# Patient Record
Sex: MALE | Race: WHITE | Employment: FULL TIME | URBAN - METROPOLITAN AREA
[De-identification: names, ages, dates, MRNs, and addresses within clinical notes are randomized per-mention and may not be internally consistent; named-entity substitution may affect disease eponyms.]

---

## 2018-02-17 ENCOUNTER — HOSPITAL ENCOUNTER (EMERGENCY)
Facility: CLINIC | Age: 28
Discharge: HOME | End: 2018-02-17
Attending: EMERGENCY MEDICINE | Admitting: EMERGENCY MEDICINE
Payer: COMMERCIAL

## 2018-02-17 VITALS
WEIGHT: 195 LBS | DIASTOLIC BLOOD PRESSURE: 74 MMHG | HEART RATE: 95 BPM | RESPIRATION RATE: 18 BRPM | BODY MASS INDEX: 26.41 KG/M2 | OXYGEN SATURATION: 97 % | SYSTOLIC BLOOD PRESSURE: 132 MMHG | HEIGHT: 72 IN | TEMPERATURE: 101.8 F

## 2018-02-17 DIAGNOSIS — J11.1 INFLUENZA: ICD-10-CM

## 2018-02-17 PROCEDURE — 96374 THER/PROPH/DIAG INJ IV PUSH: CPT

## 2018-02-17 PROCEDURE — 99284 EMERGENCY DEPT VISIT MOD MDM: CPT

## 2018-02-17 PROCEDURE — 25000128 H RX IP 250 OP 636: Performed by: EMERGENCY MEDICINE

## 2018-02-17 RX ORDER — DEXAMETHASONE SODIUM PHOSPHATE 10 MG/ML
10 INJECTION, SOLUTION INTRAMUSCULAR; INTRAVENOUS ONCE
Status: COMPLETED | OUTPATIENT
Start: 2018-02-17 | End: 2018-02-17

## 2018-02-17 RX ADMIN — DEXAMETHASONE SODIUM PHOSPHATE 10 MG: 10 INJECTION, SOLUTION INTRAMUSCULAR; INTRAVENOUS at 20:15

## 2018-02-17 ASSESSMENT — ENCOUNTER SYMPTOMS
FEVER: 1
NAUSEA: 0
COUGH: 1
CHILLS: 1
DIARRHEA: 0
VOMITING: 0

## 2018-02-17 NOTE — ED AVS SNAPSHOT
Emergency Department    [address] [place] [address]    Phone:  [phone]    Fax:  [phone]                                       Mohan Murray   MRN: [id number]    Department:   Emergency Department   Date of Visit:  2/17/2018           After Visit Summary Signature Page     I have received my discharge instructions, and my questions have been answered. I have discussed any challenges I see with this plan with the nurse or doctor.    ..........................................................................................................................................  Patient/Patient Representative Signature      ..........................................................................................................................................  Patient Representative Print Name and Relationship to Patient    ..................................................               ................................................  Date                                            Time    ..........................................................................................................................................  Reviewed by Signature/Title    ...................................................              ..............................................  Date                                                            Time

## 2018-02-17 NOTE — LETTER
February 17, 2018      To Whom It May Concern:      Mohan Murray was seen in our Emergency Department today, 02/17/18.  Please excuse him from work for the next 3 days.      Sincerely,        Claudio Arteaga MD

## 2018-02-17 NOTE — ED AVS SNAPSHOT
Emergency Department    [address] [facility] [address]    Phone:  [phone]    Fax:  [phone]                                       Mohan Murray   MRN: [id number]    Department:   Emergency Department   Date of Visit:  2/17/2018           Patient Information     Date Of Birth          1990        Your diagnoses for this visit were:     Influenza        You were seen by Claudio Arteaga MD.      Follow-up Information     Follow up with Rosy Galloway MD In 5 days.    Specialty:  Family Practice    Contact information:    [address]  [place] [address]  [phone]          Follow up with  Emergency Department.    Specialty:  EMERGENCY MEDICINE    Why:  As needed, If symptoms worsen    Contact information:    [address] [facility] [id number] [phone]        Discharge Instructions         The Flu (Influenza)     The virus that causes the flu spreads through the air in droplets when someone who has the flu coughs, sneezes, laughs, or talks.   The flu (influenza) is an infection that affects your respiratory tract. This tract is made up of your mouth, nose, and lungs, and the passages between them. Unlike a cold, the flu can make you very ill. And it can lead to pneumonia, a serious lung infection. The flu can have serious complications and even cause death.  Who is at risk for the flu?  Anyone can get the flu. But you are more likely to become infected if you:    Have a weakened immune system    Work in a healthcare setting where you may be exposed to flu germs    Live or work with someone who has the flu    Haven t had an annual flu shot  How does the flu spread?  The flu is caused by a virus. The virus spreads through the air in droplets when someone who has the flu coughs, sneezes, laughs, or talks. You can become infected when you inhale these viruses directly. You can also become infected when you touch a surface on which  the droplets have landed and then transfer the germs to your eyes, nose, or mouth. Touching used tissues, or sharing utensils, drinking glasses, or a toothbrush from an infected person can expose you to flu viruses, too.  What are the symptoms of the flu?  Flu symptoms tend to come on quickly and may last a few days to a few weeks. They include:    Fever usually higher than 100.4 F  (38 C) and chills    Sore throat and headache    Dry cough    Runny nose    Tiredness and weakness    Muscle aches  Who is at risk for flu complications?  For some people, the flu can be very serious. The risk for complications is greater for:    Children younger than age 5    Adults ages 65 and older    People with a chronic illness such as diabetes or heart, kidney, or lung disease    People who live in a nursing home or long-term care facility   How is the flu treated?  The flu usually gets better after 7 days or so. In some cases, your healthcare provider may prescribe an antiviral medicine. This may help you get well a little sooner. For the medicine to help, you need to take it as soon as possible (ideally within 48 hours) after your symptoms start. If you develop pneumonia or other serious illness, you may need to stay in the hospital.  Easing flu symptoms    Drink lots of fluids such as water, juice, and warm soup. A good rule is to drink enough so that you urinate your normal amount.    Get plenty of rest.    Ask your healthcare provider what to take for fever and pain.    Call your provider if your fever is 100.4 F (38 C) or higher, or you become dizzy, lightheaded, or short of breath.  Taking steps to protect others    Wash your hands often, especially after coughing or sneezing. Or clean your hands with an alcohol-based hand  containing at least 60% alcohol.    Cough or sneeze into a tissue. Then throw the tissue away and wash your hands. If you don t have a tissue, cough and sneeze into your elbow.    Stay home  until at least 24 hours after you no longer have a fever or chills. Be sure the fever isn t being hidden by fever-reducing medicine.    Don t share food, utensils, drinking glasses, or a toothbrush with others.    Ask your healthcare provider if others in your household should get antiviral medicine to help them avoid infection.  How can the flu be prevented?    One of the best ways to avoid the flu is to get a flu vaccine each year. The virus that causes the flu changes from year to year. For that reason, healthcare providers recommend getting the flu vaccine each year, as soon as it's available in your area. The vaccine is given as a shot. Your healthcare provider can tell you which vaccine is right for you. A nasal spray is also available but is not recommended for the 3579-4950 flu season. The CDC says this is because the nasal spray did not seem to protect against the flu over the last several flu seasons. In the past, it was meant for people ages 2 to 49.    Wash your hands often. Frequent handwashing is a proven way to help prevent infection.    Carry an alcohol-based hand gel containing at least 60% alcohol. Use it when you can't use soap and water. Then wash your hands as soon as you can.    Avoid touching your eyes, nose, and mouth.    At home and work, clean phones, computer keyboards, and toys often with disinfectant wipes.    If possible, avoid close contact with others who have the flu or symptoms of the flu.  Handwashing tips  Handwashing is one of the best ways to prevent many common infections. If you are caring for or visiting someone with the flu, wash your hands each time you enter and leave the room. Follow these steps:    Use warm water and plenty of soap. Rub your hands together well.    Clean the whole hand, including under your nails, between your fingers, and up the wrists.    Wash for at least 15 seconds.    Rinse, letting the water run down your fingers, not up your wrists.    Dry your  hands well. Use a paper towel to turn off the faucet and open the door.  Using alcohol-based hand   Alcohol-based hand  are also a good choice. Use them when you can't use soap and water. Follow these steps:    Squeeze about a tablespoon of gel into the palm of one hand.    Rub your hands together briskly, cleaning the backs of your hands, the palms, between your fingers, and up the wrists.    Rub until the gel is gone and your hands are completely dry.  Preventing the flu in healthcare settings  The flu is a special concern for people in hospitals and long-term care facilities. To help prevent the spread of flu, many hospitals and nursing homes take these steps:    Healthcare providers wash their hands or use an alcohol-based hand  before and after treating each patient.    People with the flu have private rooms and bathrooms or share a room with someone with the same infection.    People who are at high risk for the flu but don't have it are encouraged to get the flu and pneumonia vaccines.    All healthcare workers are encouraged or required to get flu shots.   Date Last Reviewed: 12/1/2016 2000-2017 [employer]. [address] [place] [address]. All rights reserved. This information is not intended as a substitute for professional medical care. Always follow your healthcare professional's instructions.          24 Hour Appointment Hotline       To make an appointment at any [facility], call [phone] ([phone]). If you don't have a family doctor or clinic, we will help you find one. [facility] are conveniently located to serve the needs of you and your family.             Review of your medicines      Our records show that you are taking the medicines listed below. If these are incorrect, please call your family doctor or clinic.        Dose / Directions Last dose taken    ADVAIR DISKUS 100-50 MCG/DOSE diskus inhaler   Generic drug:   fluticasone-salmeterol        Refills:  0        PROAIR  (90 BASE) MCG/ACT Inhaler   Generic drug:  albuterol        Refills:  0                Orders Needing Specimen Collection     None      Pending Results     No orders found from 2/15/2018 to 2/18/2018.            Pending Culture Results     No orders found from 2/15/2018 to 2/18/2018.            Pending Results Instructions     If you had any lab results that were not finalized at the time of your Discharge, you can call the ED Lab Result RN at [phone]. You will be contacted by this team for any positive Lab results or changes in treatment. The nurses are available 7 days a week from 10A to 6:30P.  You can leave a message 24 hours per day and they will return your call.        Test Results From Your Hospital Stay               Clinical Quality Measure: Blood Pressure Screening     Your blood pressure was checked while you were in the emergency department today. The last reading we obtained was  BP: 139/76 . Please read the guidelines below about what these numbers mean and what you should do about them.  If your systolic blood pressure (the top number) is less than 120 and your diastolic blood pressure (the bottom number) is less than 80, then your blood pressure is normal. There is nothing more that you need to do about it.  If your systolic blood pressure (the top number) is 120-139 or your diastolic blood pressure (the bottom number) is 80-89, your blood pressure may be higher than it should be. You should have your blood pressure rechecked within a year by a primary care provider.  If your systolic blood pressure (the top number) is 140 or greater or your diastolic blood pressure (the bottom number) is 90 or greater, you may have high blood pressure. High blood pressure is treatable, but if left untreated over time it can put you at risk for heart attack, stroke, or kidney failure. You should have your blood pressure rechecked by a primary care  "provider within the next 4 weeks.  If your provider in the emergency department today gave you specific instructions to follow-up with your doctor or provider even sooner than that, you should follow that instruction and not wait for up to 4 weeks for your follow-up visit.        Thank you for choosing [facility]       Thank you for choosing [facility] for your care. Our goal is always to provide you with excellent care. Hearing back from our patients is one way we can continue to improve our services. Please take a few minutes to complete the written survey that you may receive in the mail after you visit with us. Thank you!        [employer] Information     [employer] lets you send messages to your doctor, view your test results, renew your prescriptions, schedule appointments and more. To sign up, go to [email or web address] . Click on \"Log in\" on the left side of the screen, which will take you to the Welcome page. Then click on \"Sign up Now\" on the right side of the page.     You will be asked to enter the access code listed below, as well as some personal information. Please follow the directions to create your username and password.     Your access code is: [email or web address]  Expires: 2018  8:21 PM     Your access code will  in 90 days. If you need help or a new code, please call your [facility] clinic or [phone].        Care EveryWhere ID     This is your Care EveryWhere ID. This could be used by other organizations to access your [facility] medical records  [id number]        Equal Access to Services     LIA BECKER AH: Hadii bonnie child Sobrayan, waaxda luyaritzaadaha, qaybta kaalmada nahomy, erica barrett . So [place] [phone].    ATENCIÓN: Si habla español, tiene a pruitt disposición servicios gratuitos de asistencia lingüística. Wilmer al [phone].    We comply with applicable federal civil rights laws and [place] laws. We do not discriminate on the basis of race, color, " national origin, age, disability, sex, sexual orientation, or gender identity.            After Visit Summary       This is your record. Keep this with you and show to your community pharmacist(s) and doctor(s) at your next visit.

## 2018-02-18 NOTE — DISCHARGE INSTRUCTIONS
The Flu (Influenza)     The virus that causes the flu spreads through the air in droplets when someone who has the flu coughs, sneezes, laughs, or talks.   The flu (influenza) is an infection that affects your respiratory tract. This tract is made up of your mouth, nose, and lungs, and the passages between them. Unlike a cold, the flu can make you very ill. And it can lead to pneumonia, a serious lung infection. The flu can have serious complications and even cause death.  Who is at risk for the flu?  Anyone can get the flu. But you are more likely to become infected if you:    Have a weakened immune system    Work in a healthcare setting where you may be exposed to flu germs    Live or work with someone who has the flu    Haven t had an annual flu shot  How does the flu spread?  The flu is caused by a virus. The virus spreads through the air in droplets when someone who has the flu coughs, sneezes, laughs, or talks. You can become infected when you inhale these viruses directly. You can also become infected when you touch a surface on which the droplets have landed and then transfer the germs to your eyes, nose, or mouth. Touching used tissues, or sharing utensils, drinking glasses, or a toothbrush from an infected person can expose you to flu viruses, too.  What are the symptoms of the flu?  Flu symptoms tend to come on quickly and may last a few days to a few weeks. They include:    Fever usually higher than 100.4 F  (38 C) and chills    Sore throat and headache    Dry cough    Runny nose    Tiredness and weakness    Muscle aches  Who is at risk for flu complications?  For some people, the flu can be very serious. The risk for complications is greater for:    Children younger than age 5    Adults ages 65 and older    People with a chronic illness such as diabetes or heart, kidney, or lung disease    People who live in a nursing home or long-term care facility   How is the flu treated?  The flu usually gets  better after 7 days or so. In some cases, your healthcare provider may prescribe an antiviral medicine. This may help you get well a little sooner. For the medicine to help, you need to take it as soon as possible (ideally within 48 hours) after your symptoms start. If you develop pneumonia or other serious illness, you may need to stay in the hospital.  Easing flu symptoms    Drink lots of fluids such as water, juice, and warm soup. A good rule is to drink enough so that you urinate your normal amount.    Get plenty of rest.    Ask your healthcare provider what to take for fever and pain.    Call your provider if your fever is 100.4 F (38 C) or higher, or you become dizzy, lightheaded, or short of breath.  Taking steps to protect others    Wash your hands often, especially after coughing or sneezing. Or clean your hands with an alcohol-based hand  containing at least 60% alcohol.    Cough or sneeze into a tissue. Then throw the tissue away and wash your hands. If you don t have a tissue, cough and sneeze into your elbow.    Stay home until at least 24 hours after you no longer have a fever or chills. Be sure the fever isn t being hidden by fever-reducing medicine.    Don t share food, utensils, drinking glasses, or a toothbrush with others.    Ask your healthcare provider if others in your household should get antiviral medicine to help them avoid infection.  How can the flu be prevented?    One of the best ways to avoid the flu is to get a flu vaccine each year. The virus that causes the flu changes from year to year. For that reason, healthcare providers recommend getting the flu vaccine each year, as soon as it's available in your area. The vaccine is given as a shot. Your healthcare provider can tell you which vaccine is right for you. A nasal spray is also available but is not recommended for the 8594-5753 flu season. The CDC says this is because the nasal spray did not seem to protect against the flu  over the last several flu seasons. In the past, it was meant for people ages 2 to 49.    Wash your hands often. Frequent handwashing is a proven way to help prevent infection.    Carry an alcohol-based hand gel containing at least 60% alcohol. Use it when you can't use soap and water. Then wash your hands as soon as you can.    Avoid touching your eyes, nose, and mouth.    At home and work, clean phones, computer keyboards, and toys often with disinfectant wipes.    If possible, avoid close contact with others who have the flu or symptoms of the flu.  Handwashing tips  Handwashing is one of the best ways to prevent many common infections. If you are caring for or visiting someone with the flu, wash your hands each time you enter and leave the room. Follow these steps:    Use warm water and plenty of soap. Rub your hands together well.    Clean the whole hand, including under your nails, between your fingers, and up the wrists.    Wash for at least 15 seconds.    Rinse, letting the water run down your fingers, not up your wrists.    Dry your hands well. Use a paper towel to turn off the faucet and open the door.  Using alcohol-based hand   Alcohol-based hand  are also a good choice. Use them when you can't use soap and water. Follow these steps:    Squeeze about a tablespoon of gel into the palm of one hand.    Rub your hands together briskly, cleaning the backs of your hands, the palms, between your fingers, and up the wrists.    Rub until the gel is gone and your hands are completely dry.  Preventing the flu in healthcare settings  The flu is a special concern for people in hospitals and long-term care facilities. To help prevent the spread of flu, many hospitals and nursing homes take these steps:    Healthcare providers wash their hands or use an alcohol-based hand  before and after treating each patient.    People with the flu have private rooms and bathrooms or share a room with someone  with the same infection.    People who are at high risk for the flu but don't have it are encouraged to get the flu and pneumonia vaccines.    All healthcare workers are encouraged or required to get flu shots.   Date Last Reviewed: 12/1/2016 2000-2017 [employer]. [address] [place] [address]. All rights reserved. This information is not intended as a substitute for professional medical care. Always follow your healthcare professional's instructions.

## 2018-02-18 NOTE — ED PROVIDER NOTES
History     Chief Complaint:    Fever       HPI   Mohan Murray is a 27 year old male who presents to the ED today with a fever. The patient states that he has had a fever since last night. He also reports to having some body aches, headache, and chills. The patient states that he also has a cough, but he believes that it might be due to his asthma. He denies any nausea, vomiting, or diarrhea. He states that he took some Nyquil last night but has not had any other medication. Of note, the patient states that he did not receive his flu shot this season.     Allergies:  No known drug allergies.     Medications:    Advair Diskus   albuterol inhaler     Past Medical History:    Asthma     Past Surgical History:    History reviewed. No pertinent past surgical history.     Family History:    History reviewed. No pertinent family history.     Social History:  Marital Status: single   Presents to the ED alone   Tobacco Use: former smoker  Alcohol Use:  Yes   PCP: Rosy Galloway     Review of Systems   Constitutional: Positive for chills and fever.   Respiratory: Positive for cough.    Gastrointestinal: Negative for diarrhea, nausea and vomiting.   All other systems reviewed and are negative.    Physical Exam   First Vitals:  BP: 139/76  Heart Rate: 120  Temp: 101.8  F (38.8  C)  Height: 182.9 cm (6')  Weight: 88.5 kg (195 lb)  SpO2: 98 %      Physical Exam  General: Appears well-developed and well-nourished.   Head: No signs of trauma.   Mouth/Throat: Oropharynx is clear and moist.   Neck: Normal range of motion. No nuchal rigidity. No cervical adenopathy  CV: tachycardic and regular rhythm.    Resp: Effort normal and breath sounds normal. No respiratory distress.   GI: Soft. There is no tenderness.  No rebound or guarding.  Normal bowel sounds.    MSK: Normal range of motion. no edema. No Calf tenderness.  Neuro: The patient is alert and oriented.  Speech normal.  GCS 15  Skin: Skin is warm and dry. No rash  noted.   Psych: normal mood and affect. behavior is normal.       Emergency Department Course   Interventions:  (2015) Decadron 10 mg, IV      Emergency Department Course:  Nursing notes and vitals reviewed.  (1958) I performed an exam of the patient as documented above.    A peripheral IV was established.   Findings and plan explained to the patient. Patient discharged home with instructions regarding supportive care, medications, and reasons to return. The importance of close follow-up was reviewed.   Impression & Plan    Medical Decision Making:  Mohan Murray is a 27 year old male who presents with fever, body aches, headache, generally not feeling well.  He has had the symptoms since yesterday.  On my exam, he was mildly tachycardic, but his lung sounds are clear without any respiratory distress.  There is no meningeal signs.  Patient symptoms are very consistent with influenza.  I did discuss with him the limitations of testing for the flu and he was in agreement with not doing the test as I do not feel it would change his clinical course.  Patient does report a history of asthma and felt that he has been using his inhaler somewhat more often because of this.  I did give a dose of dexamethasone in the ER to help control possible asthma exacerbation from his influenza.  I discussed giving him some Tylenol or ibuprofen in the ER, but the patient preferred to go home and take it himself.  Given he is a young, healthy individual who is tolerating fluids well, I do not feel that further workup or treatment was indicated in the ER.  I did discuss the risks and benefits of Tamiflu and the patient declined it at this time.  Instructed to return if he has signs of worsening symptoms or any further concerns otherwise to follow-up with his primary care doctor.    Diagnosis:    ICD-10-CM    1. Influenza J11.1        Disposition:  discharged to home  Joanna BERRY, lexie serving as a scribe on 2/17/2018 at 7:58 PM  to personally document services performed by Dr. Arteaga based on my observations and the provider's statements to me.    2/17/2018    EMERGENCY DEPARTMENT       Claudio Arteaga MD  02/17/18 2179

## 2020-02-18 LAB
CHOLESTEROL (EXTERNAL): 219 MG/DL (ref 100–199)
HDLC SERPL-MCNC: 46 MG/DL
HIV 1&2 EXT: NORMAL
LDL CHOLESTEROL CALCULATED (EXTERNAL): 145 MG/DL
NON HDL CHOLESTEROL (EXTERNAL): 173 MG/DL
TRIGLYCERIDES (EXTERNAL): 138 MG/DL

## 2020-05-08 ENCOUNTER — VIRTUAL VISIT (OUTPATIENT)
Dept: FAMILY MEDICINE | Facility: OTHER | Age: 30
End: 2020-05-08

## 2020-05-08 NOTE — PROGRESS NOTES
"Date: 2020 09:58:50  Clinician: Barrett Rich  Clinician NPI: [id number]  Patient: Mohan Murray  Patient : 1990  Patient Address: [address] [place] [address] [place] [address]  Patient Phone: [phone]  Visit Protocol: URI  Patient Summary:  Mohan is a 29 year old ( : 1990 ) male who initiated a Visit for COVID-19 (Coronavirus) evaluation and screening. When asked the question \"Please sign me up to receive news, health information and promotions from [employer].\", Mohan responded \"No\".    Mohan states his symptoms started 1-2 days ago.   His symptoms consist of a sore throat, diarrhea, malaise, wheezing, and chills. He is experiencing mild difficulty breathing with activities but can speak normally in full sentences.   Symptom details     Sore throat: Mohan reports having mild throat pain (1-3 on a 10 point pain scale), does not have exudate on his tonsils, and can swallow liquids. He is not sure if the lymph nodes in his neck are enlarged. A rash has not appeared on the skin since the sore throat started.     Wheezing: Mohan has been diagnosed with asthma. The wheezing does not interfere with his normal daily activities.     Mohan denies having fever, myalgias, rhinitis, facial pain or pressure, cough, nasal congestion, vomiting, nausea, teeth pain, ageusia, anosmia, ear pain, and headache. He also denies taking antibiotic medication for the symptoms and having recent facial or sinus surgery in the past 60 days.   Precipitating events  Mohan is not sure if he has been exposed to someone with strep throat. He has not recently been exposed to someone with influenza. Mohan has been in close contact with the following high risk individuals: people with asthma, heart disease or diabetes and children under the age of 5.   Pertinent COVID-19 (Coronavirus) information  Mohan does not work or volunteer as healthcare worker or a  and does not work or volunteer in a healthcare " facility.   He does not live with a healthcare worker.   Mohan has not had a close contact with a laboratory-confirmed COVID-19 patient within 14 days of symptom onset. He also has not had a close contact with a suspected COVID-19 patient within 14 days of symptom onset.   Pertinent medical history  Mohan does not need a return to work/school note.   Weight: 220 lbs   Mohan does not smoke or use smokeless tobacco.   Weight: 220 lbs    MEDICATIONS: albuterol sulfate inhalation, Advair Diskus inhalation, ALLERGIES: NKDA  Clinician Response:  Dear Mohan,   Your symptoms show that you may have coronavirus (COVID-19). This illness can cause fever, cough and trouble breathing. Many people get a mild case and get better on their own. Some people can get very sick.   Will I be tested for COVID-19?  We would recommend you be tested for coronavirus. Here is how to get that scheduled:   Call [phone]. Tell them you were referred by [facility] to have a COVID-19 test. You will be scheduled at one of our [facility] testing locations (drive-up). Please have your [facility] visit information ready when you call including your visit ID number to verify you were referred.    How can I protect others in the meantime?  First, stay home and away from others (self-isolate) until:   You've had no fever---and no medicine that reduces fever---for 3 full days (72 hours). And...    Your other symptoms have gotten better. For example, your cough or breathing has improved. And...    At least 10 days have passed since your symptoms started.   During this time:   Don't go to work, school or anywhere else.     Stay away from others in your home. No hugging, kissing or shaking hands.    Don't let anyone visit.    Cover your mouth and nose with a mask, tissue or wash cloth to avoid spreading germs.    Wash your hands and face often. Use soap and water.   How can I take care of myself?  1.Take Tylenol (acetaminophen) for fever or pain. If you have  liver or kidney problems, ask your family doctor if it's okay to take Tylenol.   Adults can take either:    650 mg (two 325 mg pills) every 4 to 6 hours, or...    1,000 mg (two 500 mg pills) every 8 hours as needed.     Note: Don't take more than 3,000 mg in one day.   For children, check the Tylenol bottle for the right dose. The dose is based on the child's age or weight.  2.If you have other health problems (like cancer, heart failure, an organ transplant or severe kidney disease): Call your specialty clinic if you don't feel better in the next 2 days.  3.Know when to call 911: If your breathing is so bad that it keeps you from doing normal activities, call 911 or go to the emergency room. Tell them that you've been staying home and may have COVID-19.  4.Sign up for [employer]. We know it's scary to hear that you might have COVID-19. We want to track your symptoms to make sure you're okay over the next 2 weeks. Please look for an email from [employer]---this is a free, online program that we'll use to keep in touch. To sign up, follow the link in the email. Learn more at [email or web address].  Where can I get more information?  To learn more about COVID-19 and how to care for yourself at home, please visit the CDC website at https://www.cdc.gov/coronavirus/2019-ncov/about/steps-when-sick.html.  For more about your care at [facility], please visit [email or web address]     Diagnosis: Other asthma  Diagnosis ICD: J45.998

## 2021-05-08 ENCOUNTER — IMMUNIZATION (OUTPATIENT)
Dept: FAMILY MEDICINE | Facility: OTHER | Age: 31
End: 2021-05-08
Attending: FAMILY MEDICINE
Payer: COMMERCIAL

## 2021-05-08 PROCEDURE — 0001A PR COVID VAC PFIZER DIL RECON 30 MCG/0.3 ML IM: CPT

## 2021-05-08 PROCEDURE — 91300 PR COVID VAC PFIZER DIL RECON 30 MCG/0.3 ML IM: CPT

## 2021-05-27 ENCOUNTER — IMMUNIZATION (OUTPATIENT)
Dept: FAMILY MEDICINE | Facility: OTHER | Age: 31
End: 2021-05-27
Attending: FAMILY MEDICINE
Payer: COMMERCIAL

## 2021-05-27 PROCEDURE — 0002A PR COVID VAC PFIZER DIL RECON 30 MCG/0.3 ML IM: CPT

## 2021-05-27 PROCEDURE — 91300 PR COVID VAC PFIZER DIL RECON 30 MCG/0.3 ML IM: CPT

## 2025-05-23 ENCOUNTER — OFFICE VISIT (OUTPATIENT)
Dept: FAMILY MEDICINE | Facility: OTHER | Age: 35
End: 2025-05-23
Attending: STUDENT IN AN ORGANIZED HEALTH CARE EDUCATION/TRAINING PROGRAM
Payer: COMMERCIAL

## 2025-05-23 VITALS
TEMPERATURE: 97.8 F | DIASTOLIC BLOOD PRESSURE: 78 MMHG | WEIGHT: 219.1 LBS | HEIGHT: 73 IN | RESPIRATION RATE: 18 BRPM | OXYGEN SATURATION: 97 % | BODY MASS INDEX: 29.04 KG/M2 | HEART RATE: 84 BPM | SYSTOLIC BLOOD PRESSURE: 114 MMHG

## 2025-05-23 DIAGNOSIS — E55.9 VITAMIN D DEFICIENCY: ICD-10-CM

## 2025-05-23 DIAGNOSIS — J45.30 MILD PERSISTENT ASTHMA WITHOUT COMPLICATION: ICD-10-CM

## 2025-05-23 DIAGNOSIS — Z76.89 ENCOUNTER TO ESTABLISH CARE: ICD-10-CM

## 2025-05-23 DIAGNOSIS — K13.0 SORE OF LIP: ICD-10-CM

## 2025-05-23 DIAGNOSIS — R21 RASH AND NONSPECIFIC SKIN ERUPTION: ICD-10-CM

## 2025-05-23 DIAGNOSIS — K21.9 GASTROESOPHAGEAL REFLUX DISEASE WITHOUT ESOPHAGITIS: ICD-10-CM

## 2025-05-23 DIAGNOSIS — E78.5 HYPERLIPIDEMIA LDL GOAL <100: ICD-10-CM

## 2025-05-23 DIAGNOSIS — Z00.00 ROUTINE GENERAL MEDICAL EXAMINATION AT A HEALTH CARE FACILITY: Primary | ICD-10-CM

## 2025-05-23 DIAGNOSIS — F41.9 ANXIETY: ICD-10-CM

## 2025-05-23 RX ORDER — HYDROCORTISONE 25 MG/G
CREAM TOPICAL 2 TIMES DAILY
Qty: 30 G | Refills: 0 | Status: SHIPPED | OUTPATIENT
Start: 2025-05-23

## 2025-05-23 SDOH — HEALTH STABILITY: PHYSICAL HEALTH: ON AVERAGE, HOW MANY DAYS PER WEEK DO YOU ENGAGE IN MODERATE TO STRENUOUS EXERCISE (LIKE A BRISK WALK)?: 5 DAYS

## 2025-05-23 ASSESSMENT — PATIENT HEALTH QUESTIONNAIRE - PHQ9
SUM OF ALL RESPONSES TO PHQ QUESTIONS 1-9: 5
5. POOR APPETITE OR OVEREATING: SEVERAL DAYS

## 2025-05-23 ASSESSMENT — ANXIETY QUESTIONNAIRES
6. BECOMING EASILY ANNOYED OR IRRITABLE: MORE THAN HALF THE DAYS
7. FEELING AFRAID AS IF SOMETHING AWFUL MIGHT HAPPEN: NOT AT ALL
3. WORRYING TOO MUCH ABOUT DIFFERENT THINGS: MORE THAN HALF THE DAYS
GAD7 TOTAL SCORE: 7
GAD7 TOTAL SCORE: 7
2. NOT BEING ABLE TO STOP OR CONTROL WORRYING: SEVERAL DAYS
5. BEING SO RESTLESS THAT IT IS HARD TO SIT STILL: NOT AT ALL
1. FEELING NERVOUS, ANXIOUS, OR ON EDGE: SEVERAL DAYS
IF YOU CHECKED OFF ANY PROBLEMS ON THIS QUESTIONNAIRE, HOW DIFFICULT HAVE THESE PROBLEMS MADE IT FOR YOU TO DO YOUR WORK, TAKE CARE OF THINGS AT HOME, OR GET ALONG WITH OTHER PEOPLE: SOMEWHAT DIFFICULT

## 2025-05-23 ASSESSMENT — PAIN SCALES - GENERAL: PAINLEVEL_OUTOF10: NO PAIN (0)

## 2025-05-23 ASSESSMENT — SOCIAL DETERMINANTS OF HEALTH (SDOH): HOW OFTEN DO YOU GET TOGETHER WITH FRIENDS OR RELATIVES?: ONCE A WEEK

## 2025-05-23 ASSESSMENT — ASTHMA QUESTIONNAIRES: ACT_TOTALSCORE: 25

## 2025-05-23 NOTE — PATIENT INSTRUCTIONS
Zyrtec 10mg at bedtime for two weeks   Hydrocortisone 2.5mg as needed for itching.   If recurs, follow up for biopsy and labs.   Could consider buspirone (buspar). Would be 5mg twice daily.     Thank you for choosing [facility].   I have office hours 8:00 am to 4:30 pm on Mondays, Tuesdays, Thursdays and Fridays. I am out of the office most Wednesdays, although I do occasionally work one Wednesday per month.   Following your visit, if you had labs and diagnostic testing, once they have returned, I will review them and you will be contacted by myself or my nurse if there are concerns. You can also view the labs on [employer] K[employer].   For refills, notify your pharmacy regarding what you need and the pharmacy will generate a refill request. Please plan ahead and allow 3-5 days for refill requests.  If you have an urgent/same day appointment need, please request an urgent visit when you call and our support staff will send me an Overbook request to try to accommodate you for the urgent visit. I like to fit in and see my own patients and hopefully save you time too!   You can also now schedule appointments on the Notice Kiosk bob.   In the event that you need to be seen for urgent concerns and I am out of office, please see one of my colleagues for acute concerns or you may also present to Urgent Care or the ER.  I appreciate the opportunity to serve you and look forward to supporting your healthcare needs in the future.      Patient Education   Preventive Care Advice   This is general advice given by our system to help you stay healthy. However, your care team may have specific advice just for you. Please talk to your care team about your preventive care needs.  Nutrition  Eat 5 or more servings of fruits and vegetables each day.  Try wheat bread, brown rice and whole grain pasta (instead of white bread, rice, and pasta).  Get enough calcium and vitamin D. Check the label on foods and aim for 100% of the RDA (recommended  daily allowance).  Lifestyle  Exercise at least 150 minutes each week  (30 minutes a day, 5 days a week).  Do muscle strengthening activities 2 days a week. These help control your weight and prevent disease.  No smoking.  Wear sunscreen to prevent skin cancer.  Have a dental exam and cleaning every 6 months.  Yearly exams  See your health care team every year to talk about:  Any changes in your health.  Any medicines your care team has prescribed.  Preventive care, family planning, and ways to prevent chronic diseases.  Shots (vaccines)   HPV shots (up to age 26), if you've never had them before.  Hepatitis B shots (up to age 59), if you've never had them before.  COVID-19 shot: Get this shot when it's due.  Flu shot: Get a flu shot every year.  Tetanus shot: Get a tetanus shot every 10 years.  Pneumococcal, hepatitis A, and RSV shots: Ask your care team if you need these based on your risk.  Shingles shot (for age 50 and up)  General health tests  Diabetes screening:  Starting at age 35, Get screened for diabetes at least every 3 years.  If you are younger than age 35, ask your care team if you should be screened for diabetes.  Cholesterol test: At age 39, start having a cholesterol test every 5 years, or more often if advised.  Bone density scan (DEXA): At age 50, ask your care team if you should have this scan for osteoporosis (brittle bones).  Hepatitis C: Get tested at least once in your life.  STIs (sexually transmitted infections)  Before age 24: Ask your care team if you should be screened for STIs.  After age 24: Get screened for STIs if you're at risk. You are at risk for STIs (including HIV) if:  You are sexually active with more than one person.  You don't use condoms every time.  You or a partner was diagnosed with a sexually transmitted infection.  If you are at risk for HIV, ask about PrEP medicine to prevent HIV.  Get tested for HIV at least once in your life, whether you are at risk for HIV or  not.  Cancer screening tests  Cervical cancer screening: If you have a cervix, begin getting regular cervical cancer screening tests starting at age 21.  Breast cancer scan (mammogram): If you've ever had breasts, begin having regular mammograms starting at age 40. This is a scan to check for breast cancer.  Colon cancer screening: It is important to start screening for colon cancer at age 45.  Have a colonoscopy test every 10 years (or more often if you're at risk) Or, ask your provider about stool tests like a FIT test every year or Cologuard test every 3 years.  To learn more about your testing options, visit:   .  For help making a decision, visit:   https://bit.ly/go85951.  Prostate cancer screening test: If you have a prostate, ask your care team if a prostate cancer screening test (PSA) at age 55 is right for you.  Lung cancer screening: If you are a current or former smoker ages 50 to 80, ask your care team if ongoing lung cancer screenings are right for you.  For informational purposes only. Not to replace the advice of your health care provider. Copyright   2023 [facility] [employer]. All rights reserved. Clinically reviewed by the [facility]. Authernative [id number] - REV 01/24.  Learning About Stress  What is stress?     Stress is your body's response to a hard situation. Your body can have a physical, emotional, or mental response. Stress is a fact of life for most people, and it affects everyone differently. What causes stress for you may not be stressful for someone else.  A lot of things can cause stress. You may feel stress when you go on a job interview, take a test, or run a race. This kind of short-term stress is normal and even useful. It can help you if you need to work hard or react quickly. For example, stress can help you finish an important job on time.  Long-term stress is caused by ongoing stressful situations or events. Examples of long-term stress include  long-term health problems, ongoing problems at work, or conflicts in your family. Long-term stress can harm your health.  How does stress affect your health?  When you are stressed, your body responds as though you are in danger. It makes hormones that speed up your heart, make you breathe faster, and give you a burst of energy. This is called the fight-or-flight stress response. If the stress is over quickly, your body goes back to normal and no harm is done.  But if stress happens too often or lasts too long, it can have bad effects. Long-term stress can make you more likely to get sick, and it can make symptoms of some diseases worse. If you tense up when you are stressed, you may develop neck, shoulder, or low back pain. Stress is linked to high blood pressure and heart disease.  Stress also harms your emotional health. It can make you costa, tense, or depressed. Your relationships may suffer, and you may not do well at work or school.  What can you do to manage stress?  You can try these things to help manage stress:   Do something active. Exercise or activity can help reduce stress. Walking is a great way to get started. Even everyday activities such as housecleaning or yard work can help.  Try yoga or estrella chi. These techniques combine exercise and meditation. You may need some training at first to learn them.  Do something you enjoy. For example, listen to music or go to a movie. Practice your hobby or do volunteer work.  Meditate. This can help you relax, because you are not worrying about what happened before or what may happen in the future.  Do guided imagery. Imagine yourself in any setting that helps you feel calm. You can use online videos, books, or a teacher to guide you.  Do breathing exercises. For example:  From a standing position, bend forward from the waist with your knees slightly bent. Let your arms dangle close to the floor.  Breathe in slowly and deeply as you return to a standing position.  "Roll up slowly and lift your head last.  Hold your breath for just a few seconds in the standing position.  Breathe out slowly and bend forward from the waist.  Let your feelings out. Talk, laugh, cry, and express anger when you need to. Talking with supportive friends or family, a counselor, or a jeromy leader about your feelings is a healthy way to relieve stress. Avoid discussing your feelings with people who make you feel worse.  Write. It may help to write about things that are bothering you. This helps you find out how much stress you feel and what is causing it. When you know this, you can find better ways to cope.  What can you do to prevent stress?  You might try some of these things to help prevent stress:  Manage your time. This helps you find time to do the things you want and need to do.  Get enough sleep. Your body recovers from the stresses of the day while you are sleeping.  Get support. Your family, friends, and community can make a difference in how you experience stress.  Limit your news feed. Avoid or limit time on social media or news that may make you feel stressed.  Do something active. Exercise or activity can help reduce stress. Walking is a great way to get started.  Where can you learn more?  Go to https://www.[employer] .net/patiented  Enter N032 in the search box to learn more about \"Learning About Stress.\"  Current as of: October 24, 2024  Content Version: 14.4    2526-5355 [employer].   Care instructions adapted under license by your healthcare professional. If you have questions about a medical condition or this instruction, always ask your healthcare professional. [employer] disclaims any warranty or liability for your use of this information.    Learning About Depression Screening  What is depression screening?  Depression screening is a way to see if you have depression symptoms. It may be done by a doctor or counselor. It's often part of a routine checkup. " "That's because your mental health is just as important as your physical health.  Depression is a mental health condition that affects how you feel, think, and act. You may:  Have less energy.  Lose interest in your daily activities.  Feel sad and grouchy for a long time.  Depression is very common. It affects people of all ages.  Many things can lead to depression. Some people become depressed after they have a stroke or find out they have a major illness like cancer or heart disease. The death of a loved one or a breakup may lead to depression. It can run in families. Most experts believe that a combination of inherited genes and stressful life events can cause it.  What happens during screening?  You may be asked to fill out a form about your depression symptoms. You and the doctor will discuss your answers. The doctor may ask you more questions to learn more about how you think, act, and feel.  What happens after screening?  If you have symptoms of depression, your doctor will talk to you about your options.  Doctors usually treat depression with medicines or counseling. Often, combining the two works best. Many people don't get help because they think that they'll get over the depression on their own. But people with depression may not get better unless they get treatment.  The cause of depression is not well understood. There may be many factors involved. But if you have depression, it's not your fault.  A serious symptom of depression is thinking about death or suicide. If you or someone you care about talks about this or about feeling hopeless, get help right away.  It's important to know that depression can be treated. Medicine, counseling, and self-care may help.  Where can you learn more?  Go to https://www.[employer].net/patiented  Enter T185 in the search box to learn more about \"Learning About Depression Screening.\"  Current as of: July 31, 2024  Content Version: 14.4    2801-3443 [employer].   Care instructions adapted under license by your healthcare professional. If you have questions about a medical condition or this instruction, always ask your healthcare professional. [employer] disclaims any warranty or liability for your use of this information.    Substance Use Disorder: Care Instructions  Overview     You can improve your life and health by stopping your use of alcohol or drugs. When you don't drink or use drugs, you may feel and sleep better. You may get along better with your family, friends, and coworkers. There are medicines and programs that can help with substance use disorder.  How can you care for yourself at home?  Here are some ways to help you stay sober and prevent relapse.  If you have been given medicine to help keep you sober or reduce your cravings, be sure to take it exactly as prescribed.  Talk to your doctor about programs that can help you stop using drugs or drinking alcohol.  Do not keep alcohol or drugs in your home.  Plan ahead. Think about what you'll say if other people ask you to drink or use drugs. Try not to spend time with people who drink or use drugs.  Use the time and money spent on drinking or drugs to do something that's important to you.  Preventing a relapse  Have a plan to deal with relapse. Learn to recognize changes in your thinking that lead you to drink or use drugs. Get help before you start to drink or use drugs again.  Try to stay away from situations, friends, or places that may lead you to drink or use drugs.  If you feel the need to drink alcohol or use drugs again, seek help right away. Call a trusted friend or family member. Some people get support from organizations such as Narcotics Anonymous or [employer] or from treatment facilities.  If you relapse, get help as soon as you can. Some people make a plan with another person that outlines what they want that person to do for them if they relapse. The plan usually includes  how to handle the relapse and who to notify in case of relapse.  Don't give up. Remember that a relapse doesn't mean that you have failed. Use the experience to learn the triggers that lead you to drink or use drugs. Then quit again. Recovery is a lifelong process. Many people have several relapses before they are able to quit for good.  Follow-up care is a key part of your treatment and safety. Be sure to make and go to all appointments, and call your doctor if you are having problems. It's also a good idea to know your test results and keep a list of the medicines you take.  When should you call for help?   Call 911  anytime you think you may need emergency care. For example, call if you or someone else:    Has overdosed or has withdrawal signs. Be sure to tell the emergency workers that you are or someone else is using or trying to quit using drugs. Overdose or withdrawal signs may include:  Losing consciousness.  Seizure.  Seeing or hearing things that aren't there (hallucinations).     Is thinking or talking about suicide or harming others.   Where to get help 24 hours a day, 7 days a week   If you or someone you know talks about suicide, self-harm, a mental health crisis, a substance use crisis, or any other kind of emotional distress, get help right away. You can:    Call the Suicide and Crisis Lifeline at 134.     Call 6-154-294-TALK (1-284.790.1975).     Text HOME to 980627 to access the Crisis Text Line.   Consider saving these numbers in your phone.  Go to [employer].org for more information or to chat online.  Call your doctor now or seek immediate medical care if:    You are having withdrawal symptoms. These may include nausea or vomiting, sweating, shakiness, and anxiety.   Watch closely for changes in your health, and be sure to contact your doctor if:    You have a relapse.     You need more help or support to stop.   Where can you learn more?  Go to https://www.healthwise.net/patiented  Enter H573  "in the search box to learn more about \"Substance Use Disorder: Care Instructions.\"  Current as of: August 20, 2024  Content Version: 14.4 2024-2025 [employer].   Care instructions adapted under license by your healthcare professional. If you have questions about a medical condition or this instruction, always ask your healthcare professional. [employer] disclaims any warranty or liability for your use of this information.       "

## 2025-05-23 NOTE — PROGRESS NOTES
"Preventive Care Visit  [facility]  Fior Parnell MD, Family Medicine  May 23, 2025      Assessment & Plan     Routine general medical examination at a health care facility  - Lipid Profile (Chol, Trig, HDL, LDL calc); Future  - CBC with Platelets & Differential; Future  - Comprehensive metabolic panel; Future  - TSH with free T4 reflex; Future    Encounter to establish care  Medical, surgical, family, and social histories discussed updated. Reviewed active healthcare problems. Medications reviewed. [facility] notes reviewed.     Mild persistent asthma without complication  Stable. Rare symptoms. Uses inhalers with viral illness.     Anxiety  Intermittent. Tried SSRIs in the past but felt flat  Discussed option of buspar, could do as needed.   He will consider and reach out if he would like to try - would start 5mg bid as needed     Gastroesophageal reflux disease without esophagitis  Stable - in remission     Vitamin D deficiency  Will recheck level     Rash and nonspecific skin eruption  Started after first sun burn but has not resolved on one arm - pruritic. Lesions now are excoriated w/o signs of secondary infection. Will give hydrocortisone to use as needed. Could do as needed claritin. Follow up if not improving/recurs.   - hydrocortisone 2.5 % cream; Apply topically 2 times daily. Do not use longer than 14 days without 7 days off.    Sore of lip  Recommend avoiding chew in that area. If not resolving in two weeks, may need ENT consult     Hyperlipidemia LDL goal <100  Will recheck  - Lipid Profile (Chol, Trig, HDL, LDL calc); Future        BMI  Estimated body mass index is 29.31 kg/m  as calculated from the following:    Height as of this encounter: 1.842 m (6' 0.5\").    Weight as of this encounter: 99.4 kg (219 lb 1.6 oz).   Weight management plan: Discussed healthy diet and exercise guidelines    Counseling  Appropriate preventive services were addressed with this patient via screening, " questionnaire, or discussion as appropriate for fall prevention, nutrition, physical activity, Tobacco-use cessation, social engagement, weight loss and cognition.  Checklist reviewing preventive services available has been given to the patient.  Reviewed patient's diet, addressing concerns and/or questions.   The patient was instructed to see the dentist every 6 months.   He is at risk for psychosocial distress and has been provided with information to reduce risk.   The patient's PHQ-9 score is consistent with mild depression. He was provided with information regarding depression.       The longitudinal plan of care for the diagnosis(es)/condition(s) as documented were addressed during this visit. Due to the added complexity in care, I will continue to support Mohan in the subsequent management and with ongoing continuity of care.    Subjective   Mohan is a 34 year old, presenting for the following:  Physical, Gastrophageal Reflux, Anxiety, Asthma, and Vitamin Deficiency        5/23/2025     2:18 PM   Additional Questions   Roomed by Amada ALDANA   Accompanied by self         5/23/2025     2:18 PM   Patient Reported Additional Medications   Patient reports taking the following new medications none           Gets welts on skin at beginning of summer with sunburn. Doesn't happen again.   No recurrence in the sun.   Noticed last 3 years.  Some itching. No pain until he itches too much.   Arm covered in hives before.   No new spots now.   Arms, upper neck. No legs or face.     Uses nicotine pouches.   Sore on upper lip two weeks ago.     Anxiety   How are you doing with your anxiety since your last visit? No change  Are you having other symptoms that might be associated with anxiety? No  Have you had a significant life event? No   Are you feeling depressed? Yes:  at times   Do you have any concerns with your use of alcohol or other drugs? No    Sometimes weeks where he doesn't sleep well  Falls asleep but wakes up at  1pm then can't fall back asleep   Mind racing at that time. Trying to organize everything.   Wife notices on edge, etc   Two weeks every month.  History of antidepressants. Lexapro. Felt like a zombie.   Unsure about medication right now.     Social History     Tobacco Use    Smoking status: Former     Current packs/day: 0.00     Average packs/day: 0.5 packs/day for 2.0 years (1.0 ttl pk-yrs)     Types: Cigarettes     Start date:      Quit date:      Years since quittin.4   Vaping Use    Vaping status: Never Used   Substance Use Topics    Alcohol use: Yes    Drug use: No           2025     2:26 PM   Last PHQ-9   1.  Little interest or pleasure in doing things 1   2.  Feeling down, depressed, or hopeless 0   3.  Trouble falling or staying asleep, or sleeping too much 2   4.  Feeling tired or having little energy 1   5.  Poor appetite or overeating 0   6.  Feeling bad about yourself 1   7.  Trouble concentrating 0   8.  Moving slowly or restless 0   Q9: Thoughts of better off dead/self-harm past 2 weeks 0   PHQ-9 Total Score 5   Difficulty at work, home, or with people Somewhat difficult         2025     2:26 PM   GUADALUPE-7    1. Feeling nervous, anxious, or on edge 1   2. Not being able to stop or control worrying 1   3. Worrying too much about different things 2   4. Trouble relaxing 1   5. Being so restless that it is hard to sit still 0   6. Becoming easily annoyed or irritable 2   7. Feeling afraid, as if something awful might happen 0   GUADALUPE-7 Total Score 7   If you checked any problems, how difficult have they made it for you to do your work, take care of things at home, or get along with other people? Somewhat difficult     Asthma      2025     2:27 PM   ACT Total Scores   ACT TOTAL SCORE (Goal Greater than or Equal to 20) 25   In the past 12 months, how many times did you visit the emergency room for your asthma without being admitted to the hospital? 0   In the past 12 months, how many  times were you hospitalized overnight because of your asthma? 0     Do you have any of the following symptoms? None of these symptoms (cough/noisy breathing/trouble with breathing)  What makes your asthma/breathing worse?  Smoke, Upper respiratory illness, Humidity, Strong odors and fumes, Exercise or sports, Emotions, and Cold air  Do you want more information about how to use your inhaler? No    Advair only when he is sick. Has on hand. Last use this winter.   Albuterol when he is sick.     GERD/Heartburn  Onset/Duration: ongoing  Description: getting better with diet   Intensity: mild  Progression of Symptoms: improving and intermittent  Accompanying Signs & Symptoms:  Does it feel like food gets stuck or trouble swallowing: No  Nausea: No  Vomiting (bloody?): No  Abdominal Pain: No  Black-Tarry stools: No  Bloody stools: No  History:  Previous similar episodes: YES  Previous ulcers: No  Precipitating factors:   Caffeine use: YES  Alcohol use: YES  NSAID/Aspirin use: No  Tobacco use: No  Worse with junk food .  Alleviating factors: diet changes   Therapies tried and outcome:             Lifestyle changes: diet changes             Medications: none    Used to be bad but less of an issue.   Better food choices now.     Advance Care Planning  Discussed advance care planning with patient; however, patient declined at this time.        5/23/2025   General Health   How would you rate your overall physical health? Good   Feel stress (tense, anxious, or unable to sleep) Rather much   (!) STRESS CONCERN      5/23/2025   Nutrition   Three or more servings of calcium each day? (!) NO   Diet: Carbohydrate counting   How many servings of fruit and vegetables per day? (!) 2-3   How many sweetened beverages each day? 0-1         5/23/2025   Exercise   Days per week of moderate/strenous exercise 5 days         5/23/2025   Social Factors   Frequency of gathering with friends or relatives Once a week   Worry food won't last until  "get money to buy more No   Food not last or not have enough money for food? No   Do you have housing? (Housing is defined as stable permanent housing and does not include staying outside in a car, in a tent, in an abandoned building, in an overnight shelter, or couch-surfing.) Yes   Are you worried about losing your housing? No   Lack of transportation? No   Unable to get utilities (heat,electricity)? No         2025   Dental   Dentist two times every year? (!) NO     Today's PHQ-2 Score:       2025     2:17 PM   PHQ-2 (  [employer])   Q1: Little interest or pleasure in doing things 1   Q2: Feeling down, depressed or hopeless 1   PHQ-2 Score 2    Q1: Little interest or pleasure in doing things Several days   Q2: Feeling down, depressed or hopeless Several days   PHQ-2 Score 2       Patient-reported           2025   Substance Use   Alcohol more than 3/day or more than 7/wk No   Do you use any other substances recreationally? (!) CANNABIS PRODUCTS     Social History     Tobacco Use    Smoking status: Former     Current packs/day: 0.00     Average packs/day: 0.5 packs/day for 2.0 years (1.0 ttl pk-yrs)     Types: Cigarettes     Start date:      Quit date: 2012     Years since quittin.4   Vaping Use    Vaping status: Never Used   Substance Use Topics    Alcohol use: Yes    Drug use: No         2025   STI Screening   New sexual partner(s) since last STI/HIV test? No         2025   Contraception/Family Planning   Questions about contraception or family planning No       Reviewed and updated as needed this visit by Provider   Tobacco    Problems  Med Hx  Surg Hx  Fam Hx  Soc Hx Sexual Activity               Objective    Exam  /78 (BP Location: Left arm, Patient Position: Sitting, Cuff Size: Adult Large)   Pulse 84   Temp 97.8  F (36.6  C) (Tympanic)   Resp 18   Ht 1.842 m (6' 0.5\")   Wt 99.4 kg (219 lb 1.6 oz)   SpO2 97%   BMI 29.31 kg/m     Estimated body mass index is " "29.31 kg/m  as calculated from the following:    Height as of this encounter: 1.842 m (6' 0.5\").    Weight as of this encounter: 99.4 kg (219 lb 1.6 oz).    Physical Exam  Constitutional:       General: He is not in acute distress.     Appearance: Normal appearance. He is not ill-appearing.   HENT:      Head: Normocephalic and atraumatic.      Right Ear: Tympanic membrane, ear canal and external ear normal.      Left Ear: Tympanic membrane, ear canal and external ear normal.      Nose: Nose normal.      Mouth/Throat:      Mouth: Mucous membranes are moist.      Pharynx: Oropharynx is clear. No oropharyngeal exudate.   Eyes:      Extraocular Movements: Extraocular movements intact.      Conjunctiva/sclera: Conjunctivae normal.      Pupils: Pupils are equal, round, and reactive to light.   Neck:      Thyroid: No thyroid mass, thyromegaly or thyroid tenderness.   Cardiovascular:      Rate and Rhythm: Normal rate and regular rhythm.      Heart sounds: No murmur heard.  Pulmonary:      Effort: Pulmonary effort is normal.      Breath sounds: Normal breath sounds. No wheezing, rhonchi or rales.   Abdominal:      General: Bowel sounds are normal. There is no distension.      Palpations: Abdomen is soft. There is no hepatomegaly, splenomegaly or mass.      Tenderness: There is no abdominal tenderness.   Musculoskeletal:         General: Normal range of motion.      Cervical back: Normal range of motion and neck supple. No tenderness.      Right lower leg: No edema.      Left lower leg: No edema.   Lymphadenopathy:      Cervical: No cervical adenopathy.   Skin:     General: Skin is warm and dry.      Capillary Refill: Capillary refill takes less than 2 seconds.      Findings: Rash present.      Comments: A few erythematous excoriated papules over left forearm   Neurological:      General: No focal deficit present.      Mental Status: He is alert and oriented to person, place, and time.   Psychiatric:         Mood and Affect: " Mood normal.         Behavior: Behavior normal.         Thought Content: Thought content normal.         Judgment: Judgment normal.               Signed Electronically by: Fior Parnell MD

## 2025-05-26 PROBLEM — E78.5 HYPERLIPIDEMIA LDL GOAL <100: Status: ACTIVE | Noted: 2025-05-26

## 2025-07-30 ENCOUNTER — TRANSFERRED RECORDS (OUTPATIENT)
Dept: HEALTH INFORMATION MANAGEMENT | Facility: HOSPITAL | Age: 35
End: 2025-07-30